# Patient Record
Sex: FEMALE | Race: WHITE | NOT HISPANIC OR LATINO | URBAN - NONMETROPOLITAN AREA
[De-identification: names, ages, dates, MRNs, and addresses within clinical notes are randomized per-mention and may not be internally consistent; named-entity substitution may affect disease eponyms.]

---

## 2017-03-10 ENCOUNTER — OUTSIDE FACILITY SERVICE (OUTPATIENT)
Dept: CARDIOLOGY | Facility: CLINIC | Age: 67
End: 2017-03-10

## 2017-03-10 ENCOUNTER — OFFICE VISIT (OUTPATIENT)
Dept: CARDIOLOGY | Facility: CLINIC | Age: 67
End: 2017-03-10

## 2017-03-10 VITALS
DIASTOLIC BLOOD PRESSURE: 58 MMHG | HEIGHT: 67 IN | OXYGEN SATURATION: 98 % | HEART RATE: 64 BPM | SYSTOLIC BLOOD PRESSURE: 116 MMHG | WEIGHT: 239 LBS | BODY MASS INDEX: 37.51 KG/M2

## 2017-03-10 DIAGNOSIS — E78.5 HYPERLIPIDEMIA, UNSPECIFIED HYPERLIPIDEMIA TYPE: ICD-10-CM

## 2017-03-10 DIAGNOSIS — I49.1 PAC (PREMATURE ATRIAL CONTRACTION): Primary | ICD-10-CM

## 2017-03-10 DIAGNOSIS — R53.1 WEAKNESS: ICD-10-CM

## 2017-03-10 DIAGNOSIS — F03.90 DEMENTIA WITHOUT BEHAVIORAL DISTURBANCE, UNSPECIFIED DEMENTIA TYPE: ICD-10-CM

## 2017-03-10 DIAGNOSIS — E03.9 HYPOTHYROIDISM, UNSPECIFIED TYPE: ICD-10-CM

## 2017-03-10 DIAGNOSIS — I10 ESSENTIAL HYPERTENSION: ICD-10-CM

## 2017-03-10 PROCEDURE — 99203 OFFICE O/P NEW LOW 30 MIN: CPT | Performed by: NURSE PRACTITIONER

## 2017-03-10 PROCEDURE — 93000 ELECTROCARDIOGRAM COMPLETE: CPT | Performed by: NURSE PRACTITIONER

## 2017-03-10 PROCEDURE — 93227 XTRNL ECG REC<48 HR R&I: CPT | Performed by: INTERNAL MEDICINE

## 2017-03-10 RX ORDER — ENALAPRIL MALEATE 20 MG/1
20 TABLET ORAL 2 TIMES DAILY
COMMUNITY

## 2017-03-10 RX ORDER — UBIDECARENONE 75 MG
CAPSULE ORAL WEEKLY
COMMUNITY
End: 2017-03-10

## 2017-03-10 RX ORDER — NABUMETONE 750 MG/1
750 TABLET, FILM COATED ORAL DAILY
COMMUNITY
Start: 2017-03-07

## 2017-03-10 RX ORDER — AMITRIPTYLINE HYDROCHLORIDE 75 MG/1
75 TABLET, FILM COATED ORAL NIGHTLY
COMMUNITY
Start: 2017-03-07

## 2017-03-10 RX ORDER — LEVOTHYROXINE SODIUM 0.12 MG/1
125 TABLET ORAL DAILY
COMMUNITY

## 2017-03-10 RX ORDER — SIMVASTATIN 80 MG
80 TABLET ORAL NIGHTLY
COMMUNITY

## 2017-03-10 RX ORDER — SERTRALINE HYDROCHLORIDE 100 MG/1
200 TABLET, FILM COATED ORAL DAILY
COMMUNITY
Start: 2017-03-07

## 2017-03-10 RX ORDER — NIACIN 500 MG/1
500 TABLET ORAL NIGHTLY
COMMUNITY

## 2017-03-10 NOTE — PROGRESS NOTES
Subjective:     Encounter Date:03/10/2017    Chief Complaint:    Patient ID: Riri Conner is a 66 y.o. female here today for cardiac evaluation for an irregular heart beat and abnormal EKG. She is accompanied by her  Johnnie due to moderate to severe memory loss.     Other   This is a new problem. The current episode started more than 1 month ago. The problem occurs intermittently. The problem has been unchanged. Associated symptoms include fatigue and weakness. Pertinent negatives include no abdominal pain, chest pain, chills, congestion, coughing, fever, headaches, nausea, neck pain, numbness, rash or vomiting. Nothing aggravates the symptoms.     HPI     Abnormal ECG    Additional comments: referred by Pamella Lewis        Last edited by MARIN Sparks on 3/10/2017  8:29 AM. (History)      History:   Past Medical History   Diagnosis Date   • Abnormal ECG    • Arrhythmia    • Dementia    • Depression    • Hyperlipidemia    • Hypertension    • Hypothyroidism      Past Surgical History   Procedure Laterality Date   • Back surgery     • Tonsillectomy       Social History     Social History   • Marital status: Unknown     Spouse name: N/A   • Number of children: N/A   • Years of education: N/A     Occupational History   • Not on file.     Social History Main Topics   • Smoking status: Former Smoker     Packs/day: 1.00     Years: 25.00     Types: Cigarettes     Start date: 1970     Quit date: 1995   • Smokeless tobacco: Never Used   • Alcohol use No   • Drug use: No   • Sexual activity: Not on file     Other Topics Concern   • Not on file     Social History Narrative     Family History   Problem Relation Age of Onset   • Hypertension Mother        Outpatient Prescriptions Marked as Taking for the 3/10/17 encounter (Office Visit) with MARIN Sparks   Medication Sig Dispense Refill   • amitriptyline (ELAVIL) 75 MG tablet Take 75 mg by mouth Every Night.     • enalapril (VASOTEC) 20  MG tablet Take 20 mg by mouth 2 (Two) Times a Day.     • levothyroxine (SYNTHROID, LEVOTHROID) 125 MCG tablet Take 125 mcg by mouth Daily.     • nabumetone (RELAFEN) 750 MG tablet Take 750 mg by mouth Daily.     • Niacin, Antihyperlipidemic, 500 MG tablet Take 500 mg by mouth Every Night.     • sertraline (ZOLOFT) 100 MG tablet Take 200 mg by mouth Daily.     • simvastatin (ZOCOR) 80 MG tablet Take 80 mg by mouth Every Night.     • VITAMIN B1-B12 IM Inject 1,000 mcg into the shoulder, thigh, or buttocks 1 (One) Time Per Week. For 4 weeks     • [DISCONTINUED] vitamin B-12 (CYANOCOBALAMIN) 100 MCG tablet Take  by mouth 1 (One) Time Per Week.         Review of Systems:  Review of Systems   Constitution: Positive for fatigue and weakness. Negative for chills, decreased appetite, fever and malaise/fatigue.   HENT: Negative for congestion, headaches and nosebleeds.    Eyes: Negative for blurred vision and double vision.   Cardiovascular: Positive for irregular heartbeat (doesn't feel them, heard on exam and seen on EKG). Negative for chest pain, leg swelling, palpitations and syncope.   Respiratory: Negative for cough, shortness of breath and wheezing.    Endocrine: Negative for cold intolerance and heat intolerance.   Hematologic/Lymphatic: Does not bruise/bleed easily.   Skin: Negative for dry skin and rash.   Musculoskeletal: Negative for back pain, joint pain, muscle cramps, neck pain and stiffness.   Gastrointestinal: Negative for abdominal pain, constipation, diarrhea, heartburn, melena, nausea and vomiting.   Genitourinary: Negative for hematuria and nocturia.   Neurological: Positive for loss of balance. Negative for dizziness, focal weakness, light-headedness and numbness.        Has fallen in the bathtub 3 times, legs give out   Psychiatric/Behavioral: Positive for depression and memory loss (seeing Dr. Cruz, progressively worsening). The patient does not have insomnia and is not nervous/anxious.        "    Objective:     Visit Vitals   • /58 (BP Location: Right arm, Patient Position: Sitting, Cuff Size: Adult)   • Pulse 64   • Ht 67\" (170.2 cm)   • Wt 239 lb (108 kg)   • SpO2 98%   • BMI 37.43 kg/m2         Physical Exam   Constitutional: She appears well-developed and well-nourished.   Obese   HENT:   Head: Normocephalic and atraumatic.   Right Ear: External ear normal.   Left Ear: External ear normal.   Nose: Nose normal.   Mouth/Throat: Oropharynx is clear and moist.   Eyes: Conjunctivae and EOM are normal. Pupils are equal, round, and reactive to light. Right eye exhibits no discharge. Left eye exhibits no discharge. No scleral icterus.   Neck: Normal range of motion. Neck supple. No JVD present. No tracheal deviation present. No thyromegaly present.   Cardiovascular: Normal rate and regular rhythm.   Occasional extrasystoles are present. Exam reveals no gallop and no friction rub.    No murmur heard.  Pulmonary/Chest: Effort normal and breath sounds normal. She has no wheezes. She has no rales.   Abdominal: Soft. Bowel sounds are normal. She exhibits no mass. There is no tenderness. There is no rebound and no guarding.   Obese   Musculoskeletal: She exhibits no edema, tenderness or deformity.   Lymphadenopathy:     She has no cervical adenopathy.   Neurological: She is alert. She is disoriented. No cranial nerve deficit.   Thinks year is 2012, knows she is in [place] but thought [facility]   Skin: Skin is warm and dry.   Psychiatric: She has a normal mood and affect. Her speech is normal and behavior is normal. Thought content normal. She exhibits abnormal recent memory.   Vitals reviewed.      Lab/Diagnostics Review:   1/26/2017 EKG SR 68 bpm rare PACs    No labs available for review    ECG 12 Lead  Date/Time: 3/10/2017 8:43 AM  Performed by: DAVE ANDERSON  Authorized by: DAVE ANDERSON   Comparison: compared with previous ECG from 1/26/2017  Similar to previous ECG  Rhythm: sinus " "rhythm  Ectopy: atrial premature contractions  Rate: normal  BPM: 74  QRS axis: normal  Clinical impression: non-specific ECG          Reviewed 1/27/17 office note from Pamella FUNES        Assessment/Plan:         Riri was seen today for abnormal ecg.    Diagnoses and all orders for this visit:    PAC (premature atrial contraction)  Comments:  probably benign, check Holter monitor, call if worsens  Orders:  -     Holter Monitor - 24 Hour    Weakness  Comments:  unclear etiology, Dr. Cruz evaluating  Orders:  -     Holter Monitor - 24 Hour    Essential hypertension  Comments:  well controlled with medications    Hyperlipidemia, unspecified hyperlipidemia type  Comments:  unknown control on high dose statin    Hypothyroidism, unspecified type  Comments:  unknown control on thyroid replacement    Dementia without behavioral disturbance, unspecified dementia type  Comments:  Dr. Cruz following, has tried Aricept but didn't work, Namenda made her \"strange\"    Other orders  -     ECG 12 Lead        Return in about 2 months (around 5/10/2017).           Karly Gilbert APRN, ACNP-BC, CHFN-BC       "

## 2017-03-10 NOTE — PATIENT INSTRUCTIONS
Premature Atrial Contraction  Premature atrial contractions (PACs) happen when your heart beats before it has had time to fill with blood. Your heart then has to pause until it can fill with blood for the next beat. This causes the next beat to be more forceful. PACs are also called skipped heartbeats because it may feel like your heart stops for a second.   Your heart has four chambers. There are two upper chambers (atria) and two lower chambers (ventricles). All the chambers need to work together to pump blood properly. Electrical signals spread across your heart and make all the chambers beat together. The signal to beat starts in your atria. If the atria fire a bit early, you may have a PAC.   CAUSES   The cause of a PAC is often unknown. PACs are sometimes caused by heart disease or injury.  RISK FACTORS  PACs are more common in children and older people. Other risk factors that may trigger PACs include:  · Caffeine.  · Stress.  · Fatigue.  · Alcohol.  · Smoking.  · Stimulant drugs. These may be prescription or illegal drugs.  · Heart disease.  SIGNS AND SYMPTOMS  PACs are very common, especially in children and people 50 years and older. PACs do not cause dizziness, shortness of breath, or chest pain. The only symptom of a PAC is the sensation of a skipped or fluttering heartbeat.   DIAGNOSIS   Your health care provider can diagnose PAC based on the description of your symptom. Your health care provider may also:  · Perform a physical exam to listen to your heart. Your heart may sound normal during this exam.  · Perform tests to rule out other conditions. These tests may include an electrical tracing of your heart called electrocardiogram (ECG). You may need to wear a portable ECG machine (Holter monitor) that records your heart for 24 hours or more.  TREATMENT   In most cases, PACs do not need to be treated. If you have frequent PACs that are caused by heart disease, you may be treated for the underlying  condition.  HOME CARE INSTRUCTIONS  · Do not use any tobacco products, including cigarettes, chewing tobacco, or electronic cigarettes. If you need help quitting, ask your health care provider.  · Limit alcohol intake to no more than 1 drink per day for nonpregnant women and 2 drinks per day for men. One drink equals 12 ounces of beer, 5 ounces of wine, or 1½ ounces of hard liquor.  · Limit the amount of caffeine you take in.  · Do not use illegal drugs.  · Get at least 8 hours of sleep every night.  · Find healthy ways to manage stress.  · Get regular exercise. Ask your health care provider to suggest some activities that are safe for you.  SEEK MEDICAL CARE IF:  · You feel your heart skipping beats often (more than once a day).  · Your heart skips beats and you feel dizzy, lightheaded, or very tired.  SEEK IMMEDIATE MEDICAL CARE IF:   · You have chest pain.  · You have trouble breathing.     This information is not intended to replace advice given to you by your health care provider. Make sure you discuss any questions you have with your health care provider.     Document Released: 08/21/2015 Document Revised: 05/03/2016 Document Reviewed: 08/21/2015  [employer] Interactive Patient Education ©2016 [employer]

## 2017-03-15 ENCOUNTER — TELEPHONE (OUTPATIENT)
Dept: CARDIOLOGY | Facility: CLINIC | Age: 67
End: 2017-03-15

## 2017-03-15 DIAGNOSIS — E78.5 HYPERLIPIDEMIA, UNSPECIFIED HYPERLIPIDEMIA TYPE: Primary | ICD-10-CM

## 2017-03-15 NOTE — PROGRESS NOTES
Holter monitor did not show any signficant arrhythmias. She has frequent early heart beats from the top chambers of her heart that are annoying but not dangerous.

## 2017-03-15 NOTE — TELEPHONE ENCOUNTER
She had a lot of labs done recently, but I don't see a lipid panel. Have her get a fasting lipid panel if not done in the past year. Can check with Pamella Lewis or Dr. Cruz. [place]!

## 2017-03-16 NOTE — TELEPHONE ENCOUNTER
CAN YOU PLEASE PU ORDER IN FOR LIPD PROFILE TO BE DRAWN [facility]

## 2017-03-18 ENCOUNTER — RESULTS ENCOUNTER (OUTPATIENT)
Dept: CARDIOLOGY | Facility: CLINIC | Age: 67
End: 2017-03-18

## 2017-03-18 DIAGNOSIS — E78.5 HYPERLIPIDEMIA, UNSPECIFIED HYPERLIPIDEMIA TYPE: ICD-10-CM

## 2017-05-19 ENCOUNTER — OFFICE VISIT (OUTPATIENT)
Dept: CARDIOLOGY | Facility: CLINIC | Age: 67
End: 2017-05-19

## 2017-05-19 VITALS
HEART RATE: 72 BPM | OXYGEN SATURATION: 98 % | BODY MASS INDEX: 37.35 KG/M2 | WEIGHT: 238 LBS | SYSTOLIC BLOOD PRESSURE: 140 MMHG | HEIGHT: 67 IN | DIASTOLIC BLOOD PRESSURE: 80 MMHG

## 2017-05-19 DIAGNOSIS — E78.5 HYPERLIPIDEMIA, UNSPECIFIED HYPERLIPIDEMIA TYPE: Chronic | ICD-10-CM

## 2017-05-19 DIAGNOSIS — R53.82 CHRONIC FATIGUE: Chronic | ICD-10-CM

## 2017-05-19 DIAGNOSIS — E03.9 HYPOTHYROIDISM, UNSPECIFIED TYPE: ICD-10-CM

## 2017-05-19 DIAGNOSIS — I49.1 PAC (PREMATURE ATRIAL CONTRACTION): Primary | Chronic | ICD-10-CM

## 2017-05-19 DIAGNOSIS — I10 ESSENTIAL HYPERTENSION: Chronic | ICD-10-CM

## 2017-05-19 DIAGNOSIS — F03.90 DEMENTIA WITHOUT BEHAVIORAL DISTURBANCE, UNSPECIFIED DEMENTIA TYPE: Chronic | ICD-10-CM

## 2017-05-19 PROCEDURE — 93000 ELECTROCARDIOGRAM COMPLETE: CPT | Performed by: NURSE PRACTITIONER

## 2017-05-19 PROCEDURE — 94762 N-INVAS EAR/PLS OXIMTRY CONT: CPT | Performed by: NURSE PRACTITIONER

## 2017-05-19 PROCEDURE — 99214 OFFICE O/P EST MOD 30 MIN: CPT | Performed by: NURSE PRACTITIONER

## 2017-05-26 ENCOUNTER — TELEPHONE (OUTPATIENT)
Dept: CARDIOLOGY | Facility: CLINIC | Age: 67
End: 2017-05-26

## 2018-02-06 ENCOUNTER — OUTSIDE FACILITY SERVICE (OUTPATIENT)
Dept: CARDIOLOGY | Facility: CLINIC | Age: 68
End: 2018-02-06

## 2018-02-06 PROCEDURE — 93306 TTE W/DOPPLER COMPLETE: CPT | Performed by: INTERNAL MEDICINE
